# Patient Record
(demographics unavailable — no encounter records)

---

## 2025-04-02 NOTE — ASSESSMENT
[FreeTextEntry1] : DARRION HOYT is a 23 year y/o F with history and physical exam consistent with left knee chondromalacia of the patella given her mechanism of injury with lunges at the gym. Patient now experiencing anterior knee pain exacerbated by squatting, consistent with the PFJ.    - Recommend physical therapy to regain range of motion, strengthening and symptomatic improvement. Prescription given in office today.   -  After discussion of options, Patient was provided with a prescription for Meloxicam 15mg. Time was taken to discuss the importance of proper prescription drug management. They were instructed to take this daily with food for two weeks and then intermittently as needed. The patient was also warned of potential risks/side effects of the medication. These potential risks include bruising, gastrointestinal bleed, gastrointestinal burning, allergic reaction and reduced blood clotting. The patient expressed verbal understanding. I recommend that the patient follow-up with their medical physician to discuss any significant specific potential issues with long term use such as interactions with current medications or with exacerbation of underlying medical morbidities.  - Recommend rest, ice, compression, elevation - Recommend activity modification, avoid strenuous or high impact activities - avoid deep squatting or lunge activities.    Patient was educated on their diagnosis today. Emphasized the importance of gentle stretching and strengthening. Patient expressed understanding and all questions were answered today.   Follow up as needed. Can consider MRI at future visit based off symptoms.

## 2025-04-02 NOTE — IMAGING
[de-identified] :  LEFT Knee examined.   Patient is in no acute distress, alert and oriented   Inspection: Skin is intact - Effusion Atrophy is not present Antalgic gait is not present   Palpation: - Medial joint line tenderness - Lateral joint line tenderness - Patellar tenderness - Pes anserine bursa   Calf soft and nontender   Motion: Knee extension is 0 Knee flexion is 130 with anterior knee pain    Strength: Quadriceps strength is 5/5 Hamstring strength is 5/5   Special Tests: Lachman is normal Posterior drawer is normal 1+ Varus laxity  1+ Valgus laxity   - Medial Seb test - Lateral Seb test Patellofemoral grind test is normal Patellar apprehension test is normal   NV exam grossly intact distally. Sensation is grossly intact to light touch in the foot Motor function is 5/5 in the foot Capillary refill is less than 2 seconds in the toes Lymphadenopathy is not present Peripheral edema is not present   04/02: AP, Lateral, Coats, Notch view of the LEFT knee. Tibiofemoral joint well maintained. PFJ well maintained. Patella nelly noted. No fractures noted.

## 2025-04-02 NOTE — HISTORY OF PRESENT ILLNESS
[de-identified] : 04/02/2025 HPI: DARRION HOYT is a 23 year y/o F who presents for left knee pain x 2 weeks. Patient was doing [employer]. Patient's symptoms were improving but then a couple of days ago after she was on her feet at her job and noticed increased pain and swelling. Patient notes symptoms are exacerbated by squatting. Patient has attempted conservative measures including ibuprofen, which takes the edge off. Mauri has been doing light PT exercises, as her friends are physical therapists. Patient denies mechanical symptoms such as buckling or locking.     Patient presents today, 23 year F, for evaluation of their LT KNEE Date of Injury/Onset: 2 weeks Mechanism of injury: Patient was working out at gym and felt discomfort that evening, which progressively got worse This is not a Work-Related Injury being treated under Worker's Compensation. This is not an athletic injury occurring associated with an interscholastic or organized sports team.   Pain: At Rest: 1 /10 With Activity: 8 /10 - patient states it is more of a discomfort than pain Quality of symptoms: feels swollen, discomfort when moving in the left knee   Affecting Sleep: No Stiffness upon waking, lasting greater than 30mins: Yes, a little Difficulty with stairs: No Difficulty getting in and out of car: No Difficulty applying shoe: Yes, a little Sit to stand stiffness: Yes Affects walking short/long distances? Yes, a little Home/Work/Recreation affected? Yes   Improves with:  rest Worse with:  activity Have you been treated for this in the past? none Have you had surgery for this in the past? none   OTC Medicines: ibuprofen RX medicines:  none Heat, Ice, Elevation: None   CSI or Gel Injections: None Physical Therapy/ HEP: None   Previous Treatment/Imaging/Studies Since Last Visit: [ ]